# Patient Record
Sex: MALE | Race: BLACK OR AFRICAN AMERICAN | NOT HISPANIC OR LATINO | URBAN - NONMETROPOLITAN AREA
[De-identification: names, ages, dates, MRNs, and addresses within clinical notes are randomized per-mention and may not be internally consistent; named-entity substitution may affect disease eponyms.]

---

## 2020-07-08 ENCOUNTER — OFFICE VISIT (OUTPATIENT)
Dept: URBAN - NONMETROPOLITAN AREA CLINIC 2 | Facility: CLINIC | Age: 55
End: 2020-07-08
Payer: MEDICARE

## 2020-07-08 DIAGNOSIS — D89.9 IMMUNOSUPPRESSED STATUS: ICD-10-CM

## 2020-07-08 DIAGNOSIS — K50.00 CROHN'S DISEASE OF SMALL INTESTINE WITHOUT COMPLICATION: ICD-10-CM

## 2020-07-08 PROCEDURE — G8428 CUR MEDS NOT DOCUMENT: HCPCS | Performed by: INTERNAL MEDICINE

## 2020-07-08 PROCEDURE — 80299 QUANTITATIVE ASSAY DRUG: CPT | Performed by: INTERNAL MEDICINE

## 2020-07-08 PROCEDURE — 99213 OFFICE O/P EST LOW 20 MIN: CPT | Performed by: INTERNAL MEDICINE

## 2020-07-08 PROCEDURE — G8420 CALC BMI NORM PARAMETERS: HCPCS | Performed by: INTERNAL MEDICINE

## 2020-07-08 RX ORDER — AMLODIPINE BESYLATE 2.5 MG/1
TAKE 1 TABLET (2.5 MG) BY ORAL ROUTE ONCE DAILY TABLET ORAL 1
Qty: 0 | Refills: 0 | Status: DISCONTINUED | COMMUNITY
Start: 1900-01-01

## 2020-07-08 RX ORDER — SODIUM BICARBONATE 650 MG/1
TAKE 2 TABLETS BY ORAL ROUTE DAILY TABLET ORAL 1
Qty: 0 | Refills: 0 | Status: ACTIVE | COMMUNITY
Start: 1900-01-01

## 2020-07-08 RX ORDER — CARVEDILOL 6.25 MG/1
1 TABLET WITH FOOD TABLET, FILM COATED ORAL TWICE A DAY
Status: ACTIVE | COMMUNITY

## 2020-07-08 NOTE — HPI-TODAY'S VISIT:
Mr. Cordero returns for follow-up of ileal Crohn's disease.  He underwent ileostomy reversal with Dr. Gillette.  He is doing well so far.  He is moving his bowels 5x daily with occasional nocturnal awakenings.  Stools are mostly formed and he does not consider them to be diarrhea.  He denies abdominal pain.  His colonoscopy was unable to be performed due to rectal stenosis.  He has not trouble affording Humira and otherwise is doing well on this drug.

## 2020-07-27 ENCOUNTER — TELEPHONE ENCOUNTER (OUTPATIENT)
Dept: URBAN - METROPOLITAN AREA CLINIC 92 | Facility: CLINIC | Age: 55
End: 2020-07-27

## 2020-07-27 RX ORDER — SODIUM BICARBONATE 650 MG/1
TAKE 2 TABLETS BY ORAL ROUTE DAILY TABLET ORAL 1
Qty: 0 | Refills: 0 | Status: ACTIVE | COMMUNITY
Start: 1900-01-01

## 2020-07-27 RX ORDER — ADALIMUMAB 40MG/0.4ML
AS DIRECTED KIT SUBCUTANEOUS
Qty: 4 PRE-FILLED PEN SYRINGE | Refills: 6 | OUTPATIENT
Start: 2020-07-27 | End: 2021-02-07

## 2020-07-27 RX ORDER — CARVEDILOL 6.25 MG/1
1 TABLET WITH FOOD TABLET, FILM COATED ORAL TWICE A DAY
Status: ACTIVE | COMMUNITY

## 2020-11-16 ENCOUNTER — OFFICE VISIT (OUTPATIENT)
Dept: URBAN - NONMETROPOLITAN AREA CLINIC 13 | Facility: CLINIC | Age: 55
End: 2020-11-16

## 2020-11-19 ENCOUNTER — OFFICE VISIT (OUTPATIENT)
Dept: URBAN - NONMETROPOLITAN AREA CLINIC 2 | Facility: CLINIC | Age: 55
End: 2020-11-19
Payer: MEDICARE

## 2020-11-19 DIAGNOSIS — K50.018 CROHN'S DISEASE OF SMALL INTESTINE WITH OTHER COMPLICATION: ICD-10-CM

## 2020-11-19 DIAGNOSIS — K62.4 ANAL STENOSIS: ICD-10-CM

## 2020-11-19 PROCEDURE — 99213 OFFICE O/P EST LOW 20 MIN: CPT | Performed by: INTERNAL MEDICINE

## 2020-11-19 PROCEDURE — 3017F COLORECTAL CA SCREEN DOC REV: CPT | Performed by: INTERNAL MEDICINE

## 2020-11-19 PROCEDURE — G8482 FLU IMMUNIZE ORDER/ADMIN: HCPCS | Performed by: INTERNAL MEDICINE

## 2020-11-19 PROCEDURE — G8427 DOCREV CUR MEDS BY ELIG CLIN: HCPCS | Performed by: INTERNAL MEDICINE

## 2020-11-19 RX ORDER — SODIUM BICARBONATE 650 MG/1
TAKE 2 TABLETS BY ORAL ROUTE DAILY TABLET ORAL 1
Qty: 0 | Refills: 0 | Status: ACTIVE | COMMUNITY
Start: 1900-01-01

## 2020-11-19 RX ORDER — CARVEDILOL 6.25 MG/1
1 TABLET WITH FOOD TABLET, FILM COATED ORAL TWICE A DAY
Status: ACTIVE | COMMUNITY

## 2020-11-19 RX ORDER — ADALIMUMAB 40MG/0.4ML
AS DIRECTED KIT SUBCUTANEOUS
Qty: 4 PRE-FILLED PEN SYRINGE | Refills: 6 | Status: ACTIVE | COMMUNITY
Start: 2020-07-27 | End: 2021-02-07

## 2020-11-19 NOTE — HPI-TODAY'S VISIT:
Mr. Cordero returns for follow-up of ileal Crohn's disease.  He underwent ileostomy reversal with Dr. Gillette.  He is doing well so far.  He is moving his bowels 5x daily with occasional nocturnal awakenings.  Stools are mostly formed and he does not consider them to be diarrhea.  He denies abdominal pain.  His colonoscopy was unable to be performed due to rectal stenosis.  He has not trouble affording Humira and otherwise is doing well on this drug.  He is having trouble moving his bowels due to his anal stenosis.  He is able to move them but they are quite small and narrowed.  He remains on Humira and is doing well.

## 2020-11-19 NOTE — PHYSICAL EXAM GASTROINTESTINAL
Abdomen , soft, nontender, nondistended , no guarding or rigidity , no masses palpable , normal bowel sounds , Liver and Spleen , no hepatomegaly present , no hepatosplenomegaly , liver nontender , spleen not palpable , Rectal , Extremely narrow anorectal area.  ? hemorrhoids with some soft hemorrhoid type tissue present.  Unable to advance finger past mid-anal canal.

## 2021-01-06 ENCOUNTER — OFFICE VISIT (OUTPATIENT)
Dept: URBAN - NONMETROPOLITAN AREA CLINIC 2 | Facility: CLINIC | Age: 56
End: 2021-01-06

## 2021-01-28 ENCOUNTER — OFFICE VISIT (OUTPATIENT)
Dept: URBAN - NONMETROPOLITAN AREA CLINIC 2 | Facility: CLINIC | Age: 56
End: 2021-01-28

## 2021-06-23 ENCOUNTER — TELEPHONE ENCOUNTER (OUTPATIENT)
Dept: URBAN - NONMETROPOLITAN AREA CLINIC 2 | Facility: CLINIC | Age: 56
End: 2021-06-23

## 2021-06-23 RX ORDER — ADALIMUMAB 40MG/0.4ML
AS DIRECTED START DAY 28 KIT SUBCUTANEOUS EVERY OTHER WEEK
Qty: 2 PRE-FILLED PEN SYRINGE | Refills: 6 | OUTPATIENT
Start: 2021-06-23 | End: 2022-01-04

## 2021-11-03 ENCOUNTER — TELEPHONE ENCOUNTER (OUTPATIENT)
Dept: URBAN - NONMETROPOLITAN AREA CLINIC 2 | Facility: CLINIC | Age: 56
End: 2021-11-03

## 2021-11-03 RX ORDER — ADALIMUMAB 40MG/0.4ML
AS DIRECTED START DAY 28 KIT SUBCUTANEOUS EVERY OTHER WEEK
Qty: 2 PRE-FILLED PEN SYRINGE | Refills: 6
Start: 2021-06-23 | End: 2022-05-18

## 2021-11-10 ENCOUNTER — ERX REFILL RESPONSE (OUTPATIENT)
Dept: URBAN - NONMETROPOLITAN AREA CLINIC 2 | Facility: CLINIC | Age: 56
End: 2021-11-10

## 2021-11-10 RX ORDER — ADALIMUMAB 40MG/0.4ML
INJECT ONE (1) PEN (40MG) SUBCUTANEOUSLY EVERY OTHER WEEK IN ABDOMEN OR THIGH (ROTATE SITES) KIT SUBCUTANEOUS
Qty: 2 PEN NEEDLE | Refills: 7 | OUTPATIENT

## 2021-11-30 ENCOUNTER — ERX REFILL RESPONSE (OUTPATIENT)
Dept: URBAN - NONMETROPOLITAN AREA CLINIC 2 | Facility: CLINIC | Age: 56
End: 2021-11-30

## 2021-11-30 ENCOUNTER — TELEPHONE ENCOUNTER (OUTPATIENT)
Dept: URBAN - METROPOLITAN AREA CLINIC 92 | Facility: CLINIC | Age: 56
End: 2021-11-30

## 2021-11-30 RX ORDER — ADALIMUMAB 40MG/0.4ML
INJECT ONE (1) PEN (40MG) SUBCUTANEOUSLY EVERY OTHER WEEK IN ABDOMEN OR THIGH (ROTATE SITES) KIT SUBCUTANEOUS
Qty: 2 PEN NEEDLE | Refills: 7 | OUTPATIENT

## 2022-01-13 ENCOUNTER — WEB ENCOUNTER (OUTPATIENT)
Dept: URBAN - NONMETROPOLITAN AREA CLINIC 2 | Facility: CLINIC | Age: 57
End: 2022-01-13

## 2022-01-13 ENCOUNTER — OFFICE VISIT (OUTPATIENT)
Dept: URBAN - NONMETROPOLITAN AREA CLINIC 2 | Facility: CLINIC | Age: 57
End: 2022-01-13
Payer: MEDICARE

## 2022-01-13 VITALS
SYSTOLIC BLOOD PRESSURE: 117 MMHG | HEART RATE: 73 BPM | BODY MASS INDEX: 19.49 KG/M2 | DIASTOLIC BLOOD PRESSURE: 72 MMHG | WEIGHT: 117 LBS | TEMPERATURE: 97.9 F | HEIGHT: 65 IN

## 2022-01-13 DIAGNOSIS — K50.018 CROHN'S DISEASE OF SMALL INTESTINE WITH OTHER COMPLICATION: ICD-10-CM

## 2022-01-13 DIAGNOSIS — K62.4 ANAL STENOSIS: ICD-10-CM

## 2022-01-13 PROCEDURE — 99214 OFFICE O/P EST MOD 30 MIN: CPT | Performed by: NURSE PRACTITIONER

## 2022-01-13 RX ORDER — ADALIMUMAB 40MG/0.4ML
AS DIRECTED START DAY 28 KIT SUBCUTANEOUS EVERY OTHER WEEK
Qty: 2 PRE-FILLED PEN SYRINGE | Refills: 6 | Status: ACTIVE | COMMUNITY
Start: 2021-06-23 | End: 2022-05-18

## 2022-01-13 RX ORDER — COLESTIPOL HYDROCHLORIDE 1 G/1
2 TABLETS TABLET ORAL ONCE A DAY
Qty: 60 | OUTPATIENT
Start: 2022-01-13

## 2022-01-13 RX ORDER — ADALIMUMAB 40MG/0.4ML
INJECT ONE (1) PEN (40MG) SUBCUTANEOUSLY EVERY OTHER WEEK IN ABDOMEN OR THIGH (ROTATE SITES) KIT SUBCUTANEOUS
Qty: 2 PEN NEEDLE | Refills: 7 | Status: ACTIVE | COMMUNITY

## 2022-01-13 RX ORDER — CARVEDILOL 6.25 MG/1
1 TABLET WITH FOOD TABLET, FILM COATED ORAL TWICE A DAY
Status: ACTIVE | COMMUNITY

## 2022-01-13 RX ORDER — SODIUM BICARBONATE 650 MG/1
TAKE 2 TABLETS BY ORAL ROUTE DAILY TABLET ORAL 1
Qty: 0 | Refills: 0 | Status: ACTIVE | COMMUNITY
Start: 1900-01-01

## 2022-01-13 NOTE — HPI-TODAY'S VISIT:
Mr. Cordero returns for follow-up of ileal Crohn's disease.  He underwent ileostomy reversal with Dr. Gillette.  He is doing well so far.  He is moving his bowels 3-4 times daily. loose, but no blood. denies pain.  His colonoscopy was unable to be performed due to rectal stenosis.  He has not trouble affording Humira and otherwise is doing well on this drug.  He is having trouble moving his bowels due to his anal stenosis.   He remains on Humira and is doing well.  sb

## 2022-01-20 ENCOUNTER — TELEPHONE ENCOUNTER (OUTPATIENT)
Dept: URBAN - METROPOLITAN AREA CLINIC 92 | Facility: CLINIC | Age: 57
End: 2022-01-20

## 2022-03-07 ENCOUNTER — TELEPHONE ENCOUNTER (OUTPATIENT)
Dept: URBAN - NONMETROPOLITAN AREA CLINIC 2 | Facility: CLINIC | Age: 57
End: 2022-03-07

## 2022-03-07 RX ORDER — CHOLESTYRAMINE 4 G/9G
1 SCOOP POWDER, FOR SUSPENSION ORAL ONCE A DAY
Qty: 30 | OUTPATIENT
Start: 2022-03-08

## 2022-03-07 RX ORDER — ADALIMUMAB 40MG/0.4ML
AS DIRECTED START DAY 28 KIT SUBCUTANEOUS EVERY OTHER WEEK
Qty: 2 PRE-FILLED PEN SYRINGE | Refills: 6 | OUTPATIENT
Start: 2022-03-08 | End: 2022-09-20

## 2022-03-08 ENCOUNTER — ERX REFILL RESPONSE (OUTPATIENT)
Dept: URBAN - NONMETROPOLITAN AREA CLINIC 2 | Facility: CLINIC | Age: 57
End: 2022-03-08

## 2022-03-08 RX ORDER — CHOLESTYRAMINE 4 G/9G
1 SCOOP POWDER, FOR SUSPENSION ORAL ONCE A DAY
Qty: 30 | OUTPATIENT

## 2022-03-08 RX ORDER — CHOLESTYRAMINE 4 G/9G
MIX 1 SCOOP AS DIRECTED ONCE DAILY POWDER, FOR SUSPENSION ORAL
Qty: 1134 GRAM | Refills: 12 | OUTPATIENT

## 2022-07-13 ENCOUNTER — OFFICE VISIT (OUTPATIENT)
Dept: URBAN - NONMETROPOLITAN AREA CLINIC 2 | Facility: CLINIC | Age: 57
End: 2022-07-13
Payer: MEDICARE

## 2022-07-13 VITALS
WEIGHT: 112 LBS | DIASTOLIC BLOOD PRESSURE: 73 MMHG | BODY MASS INDEX: 18.66 KG/M2 | HEART RATE: 81 BPM | SYSTOLIC BLOOD PRESSURE: 124 MMHG | HEIGHT: 65 IN

## 2022-07-13 DIAGNOSIS — K62.4 ANAL STENOSIS: ICD-10-CM

## 2022-07-13 DIAGNOSIS — K50.018 CROHN'S DISEASE OF SMALL INTESTINE WITH OTHER COMPLICATION: ICD-10-CM

## 2022-07-13 PROCEDURE — 99214 OFFICE O/P EST MOD 30 MIN: CPT | Performed by: NURSE PRACTITIONER

## 2022-07-13 RX ORDER — CARVEDILOL 6.25 MG/1
1 TABLET WITH FOOD TABLET, FILM COATED ORAL TWICE A DAY
Status: ACTIVE | COMMUNITY

## 2022-07-13 RX ORDER — COLESTIPOL HYDROCHLORIDE 1 G/1
2 TABLETS TABLET ORAL ONCE A DAY
Qty: 60 | OUTPATIENT

## 2022-07-13 RX ORDER — CHOLESTYRAMINE 4 G/9G
MIX 1 SCOOP AS DIRECTED ONCE DAILY POWDER, FOR SUSPENSION ORAL
Qty: 1134 GRAM | Refills: 12 | Status: ACTIVE | COMMUNITY

## 2022-07-13 RX ORDER — ADALIMUMAB 40MG/0.4ML
INJECT ONE (1) PEN (40MG) SUBCUTANEOUSLY EVERY OTHER WEEK IN ABDOMEN OR THIGH (ROTATE SITES) KIT SUBCUTANEOUS
Qty: 2 PEN NEEDLE | Refills: 7 | Status: ACTIVE | COMMUNITY

## 2022-07-13 RX ORDER — ADALIMUMAB 40MG/0.4ML
AS DIRECTED START DAY 28 KIT SUBCUTANEOUS EVERY OTHER WEEK
Qty: 2 PRE-FILLED PEN SYRINGE | Refills: 6 | Status: ACTIVE | COMMUNITY
Start: 2022-03-08 | End: 2022-09-20

## 2022-07-13 RX ORDER — SODIUM BICARBONATE 650 MG/1
TAKE 2 TABLETS BY ORAL ROUTE DAILY TABLET ORAL 1
Qty: 0 | Refills: 0 | Status: ACTIVE | COMMUNITY
Start: 1900-01-01

## 2022-07-13 RX ORDER — COLESTIPOL HYDROCHLORIDE 1 G/1
2 TABLETS TABLET ORAL ONCE A DAY
Qty: 60 | Status: ACTIVE | COMMUNITY
Start: 2022-01-13

## 2022-07-13 NOTE — HPI-TODAY'S VISIT:
Mr. Cordero returns for follow-up of ileal Crohn's disease.  He underwent ileostomy reversal with Dr. Gillette.  He is doing well so far.  He is moving his bowels 4-5 times daily. loose, but no blood. denies pain.  His colonoscopy was unable to be performed due to rectal stenosis.  He has not trouble affording Humira and otherwise is doing well on this drug.  He reports that the colestid was out of stock and was given questran. He doesn't care for this medication.   He remains on Humira and is doing well.  sb

## 2022-07-18 ENCOUNTER — TELEPHONE ENCOUNTER (OUTPATIENT)
Dept: URBAN - METROPOLITAN AREA CLINIC 92 | Facility: CLINIC | Age: 57
End: 2022-07-18

## 2022-08-22 ENCOUNTER — ERX REFILL RESPONSE (OUTPATIENT)
Dept: URBAN - NONMETROPOLITAN AREA CLINIC 2 | Facility: CLINIC | Age: 57
End: 2022-08-22

## 2022-08-22 RX ORDER — COLESTIPOL HYDROCHLORIDE 1 G/1
TAKE 2 TABLETS BY MOUTH EVERY DAY TABLET ORAL
Qty: 60 TABLET | Refills: 3 | OUTPATIENT

## 2022-08-22 RX ORDER — COLESTIPOL HYDROCHLORIDE 1 G/1
2 TABLETS TABLET ORAL ONCE A DAY
Qty: 60 | OUTPATIENT

## 2022-12-14 ENCOUNTER — OFFICE VISIT (OUTPATIENT)
Dept: URBAN - NONMETROPOLITAN AREA CLINIC 2 | Facility: CLINIC | Age: 57
End: 2022-12-14

## 2022-12-14 RX ORDER — CHOLESTYRAMINE 4 G/9G
MIX 1 SCOOP AS DIRECTED ONCE DAILY POWDER, FOR SUSPENSION ORAL
Qty: 1134 GRAM | Refills: 12 | Status: ACTIVE | COMMUNITY

## 2022-12-14 RX ORDER — SODIUM BICARBONATE 650 MG/1
TAKE 2 TABLETS BY ORAL ROUTE DAILY TABLET ORAL 1
Qty: 0 | Refills: 0 | Status: ACTIVE | COMMUNITY
Start: 1900-01-01

## 2022-12-14 RX ORDER — COLESTIPOL HYDROCHLORIDE 1 G/1
2 TABLETS TABLET ORAL ONCE A DAY
Qty: 60 | OUTPATIENT

## 2022-12-14 RX ORDER — CARVEDILOL 6.25 MG/1
1 TABLET WITH FOOD TABLET, FILM COATED ORAL TWICE A DAY
Status: ACTIVE | COMMUNITY

## 2022-12-14 RX ORDER — COLESTIPOL HYDROCHLORIDE 1 G/1
TAKE 2 TABLETS BY MOUTH EVERY DAY TABLET ORAL
Qty: 60 TABLET | Refills: 3 | Status: ACTIVE | COMMUNITY

## 2022-12-14 RX ORDER — ADALIMUMAB 40MG/0.4ML
INJECT ONE (1) PEN (40MG) SUBCUTANEOUSLY EVERY OTHER WEEK IN ABDOMEN OR THIGH (ROTATE SITES) KIT SUBCUTANEOUS
Qty: 2 PEN NEEDLE | Refills: 7 | Status: ACTIVE | COMMUNITY

## 2022-12-14 NOTE — HPI-TODAY'S VISIT:
12/14/22: Mr. Cordero returns for follow-up of ileal Crohn's disease.  7/13/22: Mr. Cordero returns for follow-up of ileal Crohn's disease.  He underwent ileostomy reversal with Dr. Gillette.  He is doing well so far.  He is moving his bowels 4-5 times daily. loose, but no blood. denies pain.  His colonoscopy was unable to be performed due to rectal stenosis.  He has not trouble affording Humira and otherwise is doing well on this drug.  He reports that the colestid was out of stock and was given questran. He doesn't care for this medication.   He remains on Humira and is doing well.  sb

## 2023-01-24 ENCOUNTER — OUT OF OFFICE VISIT (OUTPATIENT)
Dept: URBAN - METROPOLITAN AREA MEDICAL CENTER 1 | Facility: MEDICAL CENTER | Age: 58
End: 2023-01-24
Payer: MEDICARE

## 2023-01-24 DIAGNOSIS — K50.813 CROHN'S DISEASE OF BOTH SMALL AND LARGE INTESTINE W FISTULA: ICD-10-CM

## 2023-01-24 DIAGNOSIS — K62.89 ACUTE PROCTITIS: ICD-10-CM

## 2023-01-24 DIAGNOSIS — A41.89 OTHER SPECIFIED SEPSIS: ICD-10-CM

## 2023-01-24 DIAGNOSIS — D64.89 ANEMIA DUE TO OTHER CAUSE: ICD-10-CM

## 2023-01-24 PROCEDURE — G8427 DOCREV CUR MEDS BY ELIG CLIN: HCPCS

## 2023-01-24 PROCEDURE — 99232 SBSQ HOSP IP/OBS MODERATE 35: CPT

## 2023-01-24 PROCEDURE — 99222 1ST HOSP IP/OBS MODERATE 55: CPT

## 2023-01-27 ENCOUNTER — TELEPHONE ENCOUNTER (OUTPATIENT)
Dept: URBAN - METROPOLITAN AREA CLINIC 92 | Facility: CLINIC | Age: 58
End: 2023-01-27

## 2023-02-02 ENCOUNTER — OFFICE VISIT (OUTPATIENT)
Dept: URBAN - NONMETROPOLITAN AREA CLINIC 2 | Facility: CLINIC | Age: 58
End: 2023-02-02
Payer: MEDICARE

## 2023-02-02 VITALS
HEIGHT: 65 IN | BODY MASS INDEX: 18.99 KG/M2 | HEART RATE: 84 BPM | TEMPERATURE: 97.4 F | WEIGHT: 114 LBS | DIASTOLIC BLOOD PRESSURE: 76 MMHG | SYSTOLIC BLOOD PRESSURE: 146 MMHG

## 2023-02-02 DIAGNOSIS — K62.4 ANAL STENOSIS: ICD-10-CM

## 2023-02-02 DIAGNOSIS — K50.018 CROHN'S DISEASE OF SMALL INTESTINE WITH OTHER COMPLICATION: ICD-10-CM

## 2023-02-02 PROCEDURE — 99214 OFFICE O/P EST MOD 30 MIN: CPT | Performed by: NURSE PRACTITIONER

## 2023-02-02 RX ORDER — SODIUM BICARBONATE 650 MG/1
TAKE 2 TABLETS BY ORAL ROUTE DAILY TABLET ORAL 1
Qty: 0 | Refills: 0 | Status: ACTIVE | COMMUNITY
Start: 1900-01-01

## 2023-02-02 RX ORDER — COLESTIPOL HYDROCHLORIDE 1 G/1
2 TABLETS TABLET ORAL ONCE A DAY
Qty: 60 | Status: ACTIVE | COMMUNITY

## 2023-02-02 RX ORDER — ADALIMUMAB 40MG/0.4ML
INJECT ONE (1) PEN (40MG) SUBCUTANEOUSLY EVERY OTHER WEEK IN ABDOMEN OR THIGH (ROTATE SITES) KIT SUBCUTANEOUS
Qty: 2 PEN NEEDLE | Refills: 7 | Status: ACTIVE | COMMUNITY

## 2023-02-02 RX ORDER — CARVEDILOL 6.25 MG/1
1 TABLET WITH FOOD TABLET, FILM COATED ORAL TWICE A DAY
Status: ACTIVE | COMMUNITY

## 2023-02-02 RX ORDER — COLESTIPOL HYDROCHLORIDE 1 G/1
TAKE 2 TABLETS BY MOUTH EVERY DAY TABLET ORAL
Qty: 60 TABLET | Refills: 3 | Status: ACTIVE | COMMUNITY

## 2023-02-02 RX ORDER — CHOLESTYRAMINE 4 G/9G
MIX 1 SCOOP AS DIRECTED ONCE DAILY POWDER, FOR SUSPENSION ORAL
Qty: 1134 GRAM | Refills: 12 | Status: ACTIVE | COMMUNITY

## 2023-02-02 NOTE — HPI-TODAY'S VISIT:
Mr. Cordero returns for follow-up of ileal Crohn's disease.  He underwent ileostomy reversal with Dr. Johnson.  He is doing well so far.  He is moving his bowels 4-5 times daily. loose, but no blood. denies pain.  His colonoscopy was unable to be performed due to rectal stenosis.  Unfortunately, he was recently admitted to [facility] with procitis and required an anal dil by Dr johnson. He is currently doing well. He is here today to discuss transitioning to remicade from humira.   sb

## 2023-02-07 LAB
A/G RATIO: 0.6
ABSOLUTE BASOPHILS: 67
ABSOLUTE EOSINOPHILS: 121
ABSOLUTE LYMPHOCYTES: 1313
ABSOLUTE MONOCYTES: 415
ABSOLUTE NEUTROPHILS: 4784
ALBUMIN: 2.7
ALKALINE PHOSPHATASE: 79
ALT (SGPT): 10
AST (SGOT): 14
BASOPHILS: 1
BILIRUBIN, TOTAL: 0.2
BUN/CREATININE RATIO: 5
BUN: 16
C-REACTIVE PROTEIN, QUANT: 28.5
CALCIUM: 8.8
CARBON DIOXIDE, TOTAL: 29
CHLORIDE: 105
CREATININE: 2.98
EGFR: 24
EOSINOPHILS: 1.8
GLOBULIN, TOTAL: 4.5
GLUCOSE: 97
HEMATOCRIT: 26.4
HEMOGLOBIN: 8
LYMPHOCYTES: 19.6
MCH: 25.2
MCHC: 30.3
MCV: 83.3
MITOGEN-NIL: >10
MONOCYTES: 6.2
MPV: 8.7
NEUTROPHILS: 71.4
PLATELET COUNT: 631
POTASSIUM: 4.3
PROTEIN, TOTAL: 7.2
QUANTIFERON NIL VALUE: 0.07
QUANTIFERON TB1 AG VALUE: 0
QUANTIFERON TB2 AG VALUE: <0
QUANTIFERON-TB GOLD PLUS: NEGATIVE
RDW: 15.8
RED BLOOD CELL COUNT: 3.17
SED RATE BY MODIFIED: 127
SODIUM: 142
WHITE BLOOD CELL COUNT: 6.7

## 2023-02-08 ENCOUNTER — TELEPHONE ENCOUNTER (OUTPATIENT)
Dept: URBAN - METROPOLITAN AREA CLINIC 92 | Facility: CLINIC | Age: 58
End: 2023-02-08

## 2023-02-08 PROBLEM — 56689002: Status: ACTIVE | Noted: 2020-11-19

## 2023-02-08 RX ORDER — CHOLESTYRAMINE 4 G/9G
MIX 1 SCOOP AS DIRECTED ONCE DAILY POWDER, FOR SUSPENSION ORAL
Qty: 1134 GRAM | Refills: 12 | Status: ACTIVE | COMMUNITY

## 2023-02-08 RX ORDER — ADALIMUMAB 40MG/0.4ML
INJECT ONE (1) PEN (40MG) SUBCUTANEOUSLY EVERY OTHER WEEK IN ABDOMEN OR THIGH (ROTATE SITES) KIT SUBCUTANEOUS
Qty: 2 PEN NEEDLE | Refills: 7 | Status: ACTIVE | COMMUNITY

## 2023-02-08 RX ORDER — COLESTIPOL HYDROCHLORIDE 1 G/1
2 TABLETS TABLET ORAL ONCE A DAY
Qty: 60 | Status: ACTIVE | COMMUNITY

## 2023-02-08 RX ORDER — SODIUM BICARBONATE 650 MG/1
TAKE 2 TABLETS BY ORAL ROUTE DAILY TABLET ORAL 1
Qty: 0 | Refills: 0 | Status: ACTIVE | COMMUNITY
Start: 1900-01-01

## 2023-02-08 RX ORDER — COLESTIPOL HYDROCHLORIDE 1 G/1
TAKE 2 TABLETS BY MOUTH EVERY DAY TABLET ORAL
Qty: 60 TABLET | Refills: 3 | Status: ACTIVE | COMMUNITY

## 2023-02-08 RX ORDER — CARVEDILOL 6.25 MG/1
1 TABLET WITH FOOD TABLET, FILM COATED ORAL TWICE A DAY
Status: ACTIVE | COMMUNITY

## 2023-02-08 RX ORDER — INFLIXIMAB 100 MG/1
5MG/KG  0, 2, 6, AND THEN EVERY 8 WEEKS IV INJECTION, POWDER, LYOPHILIZED, FOR SOLUTION INTRAVENOUS
OUTPATIENT
Start: 2023-02-08

## 2023-03-27 ENCOUNTER — TELEPHONE ENCOUNTER (OUTPATIENT)
Dept: URBAN - METROPOLITAN AREA CLINIC 35 | Facility: CLINIC | Age: 58
End: 2023-03-27

## 2023-03-27 ENCOUNTER — LAB OUTSIDE AN ENCOUNTER (OUTPATIENT)
Dept: URBAN - NONMETROPOLITAN AREA CLINIC 2 | Facility: CLINIC | Age: 58
End: 2023-03-27

## 2023-03-27 LAB
A/G RATIO: 0.7
ALBUMIN: 3.4
ALKALINE PHOSPHATASE: 105
ALT (SGPT): 8
ANION GAP: 13
AST (SGOT): 12
BANDS - MAN (DIFF): 1
BILIRUBIN TOTAL: 0.4
BLOOD UREA NITROGEN: 20
BUN / CREAT RATIO: 7
CALCIUM: 9.1
CHLORIDE: 104
CO2: 26
CREATININE, SERUM: 2.81
EGFR (CKD-EPI): 25
GLUCOSE: 93
HEMATOCRIT: 33.4
HEMOGLOBIN: 10.7
LYMPHOCYTES - MAN (DIFF): 9
LYMPHS MANUAL ABSOLUTE PAR: 0.75
MEAN CORPUSCULAR HEMOGLOBIN CONC: 32
MEAN CORPUSCULAR HEMOGLOBIN: 27.1
MEAN CORPUSCULAR VOLUME: 84.7
MEAN PLATELET VOLUME: 7.5
METAMYELOCTYES - MAN (DIFF): 2
MONOCYTES - MAN (DIFF): 1
MONOCYTES MANUAL ABSOLUTE COUNT PAR: 0.08
NEUTROPHILS - MAN (DIFF): 87
NEUTROPHILS MANUAL ABSOLUTE COUNT PAR: 7.3
PAR PLATELET MORPHOLOGY: (no result)
PAR RBC MORPHOLOGY: (no result)
PLATELET COUNT: 294
POTASSIUM: 3.7
PROTEIN TOTAL: 8.6
RED BLOOD CELL COUNT: 3.95
RED CELL DISTRIBUTION WIDTH: 17.2
SODIUM: 139
WHITE BLOOD CELL COUNT: 8.3

## 2023-03-28 ENCOUNTER — LAB OUTSIDE AN ENCOUNTER (OUTPATIENT)
Dept: URBAN - METROPOLITAN AREA CLINIC 35 | Facility: CLINIC | Age: 58
End: 2023-03-28

## 2023-04-12 ENCOUNTER — TELEPHONE ENCOUNTER (OUTPATIENT)
Dept: URBAN - NONMETROPOLITAN AREA CLINIC 2 | Facility: CLINIC | Age: 58
End: 2023-04-12

## 2023-04-27 ENCOUNTER — TELEPHONE ENCOUNTER (OUTPATIENT)
Dept: URBAN - NONMETROPOLITAN AREA CLINIC 2 | Facility: CLINIC | Age: 58
End: 2023-04-27

## 2023-04-27 ENCOUNTER — LAB OUTSIDE AN ENCOUNTER (OUTPATIENT)
Dept: URBAN - NONMETROPOLITAN AREA CLINIC 2 | Facility: CLINIC | Age: 58
End: 2023-04-27

## 2023-04-27 PROBLEM — 197216007: Status: ACTIVE | Noted: 2023-04-27

## 2023-04-27 RX ORDER — CIPROFLOXACIN TABLETS 500 MG/1
1 TABLET TABLET, COATED ORAL
Qty: 28 TABLET | Refills: 0 | OUTPATIENT
Start: 2023-04-27 | End: 2023-05-11

## 2023-04-27 RX ORDER — METRONIDAZOLE 500 MG/1
1 TABLET TABLET ORAL THREE TIMES A DAY
Qty: 42 TABLET | Refills: 0 | OUTPATIENT
Start: 2023-04-27 | End: 2023-05-11

## 2023-04-27 RX ORDER — SODIUM, POTASSIUM,MAG SULFATES 17.5-3.13G
177ML SOLUTION, RECONSTITUTED, ORAL ORAL
Qty: 1 | OUTPATIENT
Start: 2023-04-27 | End: 2023-04-29

## 2023-05-02 ENCOUNTER — TELEPHONE ENCOUNTER (OUTPATIENT)
Dept: URBAN - NONMETROPOLITAN AREA CLINIC 2 | Facility: CLINIC | Age: 58
End: 2023-05-02

## 2023-05-15 ENCOUNTER — OFFICE VISIT (OUTPATIENT)
Dept: URBAN - METROPOLITAN AREA MEDICAL CENTER 1 | Facility: MEDICAL CENTER | Age: 58
End: 2023-05-15
Payer: MEDICARE

## 2023-05-15 ENCOUNTER — LAB OUTSIDE AN ENCOUNTER (OUTPATIENT)
Dept: URBAN - NONMETROPOLITAN AREA CLINIC 2 | Facility: CLINIC | Age: 58
End: 2023-05-15

## 2023-05-15 DIAGNOSIS — K50.111 CROHN'S COLITIS, WITH RECTAL BLEEDING: ICD-10-CM

## 2023-05-15 PROCEDURE — 45380 COLONOSCOPY AND BIOPSY: CPT | Performed by: INTERNAL MEDICINE

## 2023-05-16 LAB
AP CASE REPORT: (no result)
AP FINAL DIAGNOSIS: (no result)
AP GROSS DESCRIPTION: (no result)
AP MICROSCOPIC DESCRIPTION: (no result)

## 2023-05-26 ENCOUNTER — TELEPHONE ENCOUNTER (OUTPATIENT)
Dept: URBAN - NONMETROPOLITAN AREA CLINIC 2 | Facility: CLINIC | Age: 58
End: 2023-05-26

## 2023-06-19 ENCOUNTER — TELEPHONE ENCOUNTER (OUTPATIENT)
Dept: URBAN - NONMETROPOLITAN AREA CLINIC 2 | Facility: CLINIC | Age: 58
End: 2023-06-19

## 2023-06-29 ENCOUNTER — TELEPHONE ENCOUNTER (OUTPATIENT)
Dept: URBAN - NONMETROPOLITAN AREA CLINIC 2 | Facility: CLINIC | Age: 58
End: 2023-06-29

## 2023-07-11 ENCOUNTER — TELEPHONE ENCOUNTER (OUTPATIENT)
Dept: URBAN - NONMETROPOLITAN AREA CLINIC 2 | Facility: CLINIC | Age: 58
End: 2023-07-11

## 2023-08-02 ENCOUNTER — OFFICE VISIT (OUTPATIENT)
Dept: URBAN - NONMETROPOLITAN AREA CLINIC 2 | Facility: CLINIC | Age: 58
End: 2023-08-02
Payer: MEDICARE

## 2023-08-02 VITALS
HEIGHT: 65 IN | BODY MASS INDEX: 17.83 KG/M2 | DIASTOLIC BLOOD PRESSURE: 84 MMHG | SYSTOLIC BLOOD PRESSURE: 146 MMHG | HEART RATE: 79 BPM | WEIGHT: 107 LBS

## 2023-08-02 DIAGNOSIS — K50.119 CROHN'S DISEASE OF PERIANAL REGION WITH COMPLICATION: ICD-10-CM

## 2023-08-02 DIAGNOSIS — K62.4 ANAL STENOSIS: ICD-10-CM

## 2023-08-02 DIAGNOSIS — K50.00 CROHN'S DISEASE OF SMALL INTESTINE WITHOUT COMPLICATION: ICD-10-CM

## 2023-08-02 PROBLEM — 235796008: Status: ACTIVE | Noted: 2023-08-02

## 2023-08-02 PROBLEM — 56689002: Status: ACTIVE | Noted: 2023-08-02

## 2023-08-02 PROCEDURE — 99213 OFFICE O/P EST LOW 20 MIN: CPT | Performed by: INTERNAL MEDICINE

## 2023-08-02 RX ORDER — CHOLESTYRAMINE 4 G/9G
MIX 1 SCOOP AS DIRECTED ONCE DAILY POWDER, FOR SUSPENSION ORAL
Qty: 1134 GRAM | Refills: 12 | Status: ACTIVE | COMMUNITY

## 2023-08-02 RX ORDER — ADALIMUMAB 40MG/0.4ML
INJECT ONE (1) PEN (40MG) SUBCUTANEOUSLY EVERY OTHER WEEK IN ABDOMEN OR THIGH (ROTATE SITES) KIT SUBCUTANEOUS
Qty: 2 PEN NEEDLE | Refills: 7 | Status: ACTIVE | COMMUNITY

## 2023-08-02 RX ORDER — SODIUM BICARBONATE 650 MG/1
TAKE 2 TABLETS BY ORAL ROUTE DAILY TABLET ORAL 1
Qty: 0 | Refills: 0 | Status: ACTIVE | COMMUNITY
Start: 1900-01-01

## 2023-08-02 RX ORDER — CARVEDILOL 6.25 MG/1
1 TABLET WITH FOOD TABLET, FILM COATED ORAL TWICE A DAY
Status: ACTIVE | COMMUNITY

## 2023-08-02 RX ORDER — COLESTIPOL HYDROCHLORIDE 1 G/1
2 TABLETS TABLET ORAL ONCE A DAY
Qty: 60 | Status: ACTIVE | COMMUNITY

## 2023-08-02 RX ORDER — COLESTIPOL HYDROCHLORIDE 1 G/1
TAKE 2 TABLETS BY MOUTH EVERY DAY TABLET ORAL
Qty: 60 TABLET | Refills: 3 | Status: ACTIVE | COMMUNITY

## 2023-08-02 RX ORDER — INFLIXIMAB 100 MG/1
5MG/KG  0, 2, 6, AND THEN EVERY 8 WEEKS IV INJECTION, POWDER, LYOPHILIZED, FOR SOLUTION INTRAVENOUS
Status: ACTIVE | COMMUNITY
Start: 2023-02-08

## 2023-08-02 NOTE — HPI-TODAY'S VISIT:
8/2/23: Mr. Cordero returns for follow-up of ileal Crohn's disease.  Since his last clinic visit, he underwent a colonoscopy using the XP scope to traverse his anal stenosis.  This showed a normal colon and ileum but a lot of ulceration and inflammation in his anal canal.  He reports that he has been doing a bit better.  His perianal complaints have improved.  He reports a "hemorrhoid" burst and he is feeling better down there of late.  He is still having a lot of diarrhea and moving his bowels up to 6x per day.  He is not on anti-diarrheals.  He does not like colestyramine powder but does not recall if it helped much.  2/2/23: Mr. Cordero returns for follow-up of ileal Crohn's disease.  He underwent ileostomy reversal with Dr. Johnson.  He is doing well so far.  He is moving his bowels 4-5 times daily. loose, but no blood. denies pain.  His colonoscopy was unable to be performed due to rectal stenosis.  Unfortunately, he was recently admitted to [facility] with procitis and required an anal dil by Dr johnson. He is currently doing well. He is here today to discuss transitioning to remicade from humira.   sb

## 2023-08-14 ENCOUNTER — TELEPHONE ENCOUNTER (OUTPATIENT)
Dept: URBAN - NONMETROPOLITAN AREA CLINIC 2 | Facility: CLINIC | Age: 58
End: 2023-08-14

## 2023-08-30 ENCOUNTER — P2P PATIENT RECORD (OUTPATIENT)
Age: 58
End: 2023-08-30

## 2023-09-11 ENCOUNTER — TELEPHONE ENCOUNTER (OUTPATIENT)
Dept: URBAN - NONMETROPOLITAN AREA CLINIC 2 | Facility: CLINIC | Age: 58
End: 2023-09-11

## 2023-09-23 NOTE — PHYSICAL EXAM CHEST:
no lesions,  no deformities,  no traumatic injuries,  no significant scars are present,  chest wall non-tender,  no masses present, breathing is unlabored without accessory muscle use, normal breath sounds Bowel obstruction

## 2023-10-11 ENCOUNTER — TELEPHONE ENCOUNTER (OUTPATIENT)
Dept: URBAN - NONMETROPOLITAN AREA CLINIC 2 | Facility: CLINIC | Age: 58
End: 2023-10-11

## 2023-11-02 ENCOUNTER — OFFICE VISIT (OUTPATIENT)
Dept: URBAN - NONMETROPOLITAN AREA CLINIC 2 | Facility: CLINIC | Age: 58
End: 2023-11-02

## 2023-11-08 ENCOUNTER — CLAIMS CREATED FROM THE CLAIM WINDOW (OUTPATIENT)
Dept: URBAN - NONMETROPOLITAN AREA CLINIC 2 | Facility: CLINIC | Age: 58
End: 2023-11-08
Payer: MEDICARE

## 2023-11-08 ENCOUNTER — LAB OUTSIDE AN ENCOUNTER (OUTPATIENT)
Dept: URBAN - NONMETROPOLITAN AREA CLINIC 2 | Facility: CLINIC | Age: 58
End: 2023-11-08

## 2023-11-08 VITALS
TEMPERATURE: 98.1 F | DIASTOLIC BLOOD PRESSURE: 88 MMHG | SYSTOLIC BLOOD PRESSURE: 145 MMHG | WEIGHT: 108.6 LBS | HEIGHT: 65 IN | HEART RATE: 74 BPM | BODY MASS INDEX: 18.09 KG/M2

## 2023-11-08 DIAGNOSIS — R93.5 ABNORMAL CT OF THE ABDOMEN: ICD-10-CM

## 2023-11-08 DIAGNOSIS — K50.119 CROHN'S DISEASE OF PERIANAL REGION WITH COMPLICATION: ICD-10-CM

## 2023-11-08 DIAGNOSIS — K50.00 CROHN'S DISEASE OF SMALL INTESTINE WITHOUT COMPLICATION: ICD-10-CM

## 2023-11-08 DIAGNOSIS — K62.4 ANAL STENOSIS: ICD-10-CM

## 2023-11-08 DIAGNOSIS — K50.118 CROHN'S DISEASE OF COLON WITH OTHER COMPLICATION: ICD-10-CM

## 2023-11-08 PROCEDURE — 99214 OFFICE O/P EST MOD 30 MIN: CPT | Performed by: NURSE PRACTITIONER

## 2023-11-08 RX ORDER — CARVEDILOL 6.25 MG/1
1 TABLET WITH FOOD TABLET, FILM COATED ORAL TWICE A DAY
Status: ACTIVE | COMMUNITY

## 2023-11-08 RX ORDER — COLESTIPOL HYDROCHLORIDE 1 G/1
2 TABLETS TABLET ORAL ONCE A DAY
Qty: 60 | Status: ACTIVE | COMMUNITY

## 2023-11-08 RX ORDER — SODIUM BICARBONATE 650 MG/1
TAKE 2 TABLETS BY ORAL ROUTE DAILY TABLET ORAL 1
Qty: 0 | Refills: 0 | Status: ACTIVE | COMMUNITY
Start: 1900-01-01

## 2023-11-08 RX ORDER — COLESTIPOL HYDROCHLORIDE 1 G/1
TAKE 2 TABLETS BY MOUTH EVERY DAY TABLET ORAL
Qty: 60 TABLET | Refills: 3 | Status: ACTIVE | COMMUNITY

## 2023-11-08 RX ORDER — CHOLESTYRAMINE 4 G/9G
MIX 1 SCOOP AS DIRECTED ONCE DAILY POWDER, FOR SUSPENSION ORAL
Qty: 1134 GRAM | Refills: 12 | Status: ACTIVE | COMMUNITY

## 2023-11-08 RX ORDER — ADALIMUMAB 40MG/0.4ML
INJECT ONE (1) PEN (40MG) SUBCUTANEOUSLY EVERY OTHER WEEK IN ABDOMEN OR THIGH (ROTATE SITES) KIT SUBCUTANEOUS
Qty: 2 PEN NEEDLE | Refills: 7 | Status: ACTIVE | COMMUNITY

## 2023-11-08 RX ORDER — INFLIXIMAB 100 MG/1
5MG/KG  0, 2, 6, AND THEN EVERY 8 WEEKS IV INJECTION, POWDER, LYOPHILIZED, FOR SOLUTION INTRAVENOUS
Status: ACTIVE | COMMUNITY
Start: 2023-02-08

## 2023-11-08 NOTE — HPI-TODAY'S VISIT:
: Mr. Cordero returns for follow-up of ileal Crohn's disease.  he underwent a colonoscopy using the XP scope to traverse his anal stenosis.  This showed a normal colon and ileum but a lot of ulceration and inflammation in his anal canal.  He reports that he has been doing a bit better.  His perianal complaints have improved. He did have some rectal pain and was seen in [facility] ED with CT with 2 lesions on his liver. he hasn't followed by about this yet. worrisome for mets vs abscess. reports much better on remicade. sb  2/2/23: Mr. Cordero returns for follow-up of ileal Crohn's disease.  He underwent ileostomy reversal with Dr. Johnson.  He is doing well so far.  He is moving his bowels 4-5 times daily. loose, but no blood. denies pain.  His colonoscopy was unable to be performed due to rectal stenosis.  Unfortunately, he was recently admitted to [facility] with procitis and required an anal dil by Dr johnson. He is currently doing well. He is here today to discuss transitioning to remicade from humira.   sb

## 2023-11-09 ENCOUNTER — TELEPHONE ENCOUNTER (OUTPATIENT)
Dept: URBAN - NONMETROPOLITAN AREA CLINIC 2 | Facility: CLINIC | Age: 58
End: 2023-11-09

## 2023-11-09 LAB
A/G RATIO: 0.6
ABSOLUTE BASOPHILS: 51
ABSOLUTE EOSINOPHILS: 449
ABSOLUTE LYMPHOCYTES: 1188
ABSOLUTE MONOCYTES: 342
ABSOLUTE NEUTROPHILS: 3070
AFP, SERUM, TUMOR MARKER: 5.2
ALBUMIN: 2.9
ALKALINE PHOSPHATASE: 107
ALT (SGPT): 12
AST (SGOT): 12
BASOPHILS: 1
BILIRUBIN, TOTAL: 0.2
BUN/CREATININE RATIO: 7
BUN: 18
CALCIUM: 8.6
CARBON DIOXIDE, TOTAL: 25
CHLORIDE: 105
CREATININE: 2.68
EGFR: 27
EOSINOPHILS: 8.8
GLOBULIN, TOTAL: 4.9
GLUCOSE: 87
HEMATOCRIT: 28.9
HEMOGLOBIN: 9.1
LYMPHOCYTES: 23.3
MCH: 27.9
MCHC: 31.5
MCV: 88.7
MONOCYTES: 6.7
MPV: 8.7
NEUTROPHILS: 60.2
PLATELET COUNT: 496
POTASSIUM: 4.3
PROTEIN, TOTAL: 7.8
RDW: 12.2
RED BLOOD CELL COUNT: 3.26
SODIUM: 139
WHITE BLOOD CELL COUNT: 5.1

## 2023-12-11 ENCOUNTER — TELEPHONE ENCOUNTER (OUTPATIENT)
Dept: URBAN - NONMETROPOLITAN AREA CLINIC 2 | Facility: CLINIC | Age: 58
End: 2023-12-11

## 2023-12-14 ENCOUNTER — TELEPHONE ENCOUNTER (OUTPATIENT)
Dept: URBAN - NONMETROPOLITAN AREA CLINIC 2 | Facility: CLINIC | Age: 58
End: 2023-12-14

## 2023-12-14 PROBLEM — 373621006: Status: ACTIVE | Noted: 2023-12-14

## 2023-12-20 ENCOUNTER — LAB OUTSIDE AN ENCOUNTER (OUTPATIENT)
Dept: URBAN - NONMETROPOLITAN AREA CLINIC 2 | Facility: CLINIC | Age: 58
End: 2023-12-20

## 2023-12-22 ENCOUNTER — TELEPHONE ENCOUNTER (OUTPATIENT)
Dept: URBAN - NONMETROPOLITAN AREA CLINIC 2 | Facility: CLINIC | Age: 58
End: 2023-12-22

## 2023-12-22 RX ORDER — UPADACITINIB 45 MG/1
ONE TABLET DAILY TABLET, EXTENDED RELEASE ORAL ONCE DAILY
Qty: 84 | Refills: 0 | OUTPATIENT
Start: 2024-01-03 | End: 2024-03-27

## 2023-12-22 RX ORDER — UPADACITINIB 30 MG/1
1 TABLET TABLET, EXTENDED RELEASE ORAL ONCE A DAY
Qty: 30 | Refills: 5 | OUTPATIENT
Start: 2024-01-03 | End: 2024-07-01

## 2024-01-04 ENCOUNTER — TELEPHONE ENCOUNTER (OUTPATIENT)
Dept: URBAN - NONMETROPOLITAN AREA CLINIC 13 | Facility: CLINIC | Age: 59
End: 2024-01-04

## 2024-01-08 ENCOUNTER — TELEPHONE ENCOUNTER (OUTPATIENT)
Dept: URBAN - NONMETROPOLITAN AREA CLINIC 2 | Facility: CLINIC | Age: 59
End: 2024-01-08

## 2024-01-10 ENCOUNTER — TELEPHONE ENCOUNTER (OUTPATIENT)
Dept: URBAN - NONMETROPOLITAN AREA CLINIC 13 | Facility: CLINIC | Age: 59
End: 2024-01-10

## 2024-01-10 ENCOUNTER — OFFICE VISIT (OUTPATIENT)
Dept: URBAN - NONMETROPOLITAN AREA CLINIC 2 | Facility: CLINIC | Age: 59
End: 2024-01-10
Payer: MEDICARE

## 2024-01-10 VITALS
HEART RATE: 121 BPM | SYSTOLIC BLOOD PRESSURE: 163 MMHG | TEMPERATURE: 98 F | BODY MASS INDEX: 18.33 KG/M2 | WEIGHT: 110 LBS | DIASTOLIC BLOOD PRESSURE: 77 MMHG | HEIGHT: 65 IN

## 2024-01-10 DIAGNOSIS — K50.119 CROHN'S DISEASE OF PERIANAL REGION WITH COMPLICATION: ICD-10-CM

## 2024-01-10 DIAGNOSIS — M06.9 RHEUMATOID ARTHRITIS, INVOLVING UNSPECIFIED SITE, UNSPECIFIED WHETHER RHEUMATOID FACTOR PRESENT: ICD-10-CM

## 2024-01-10 DIAGNOSIS — R93.5 ABNORMAL CT OF THE ABDOMEN: ICD-10-CM

## 2024-01-10 DIAGNOSIS — K62.4 ANAL STENOSIS: ICD-10-CM

## 2024-01-10 DIAGNOSIS — K50.00 CROHN'S DISEASE OF SMALL INTESTINE WITHOUT COMPLICATION: ICD-10-CM

## 2024-01-10 PROBLEM — 69896004: Status: ACTIVE | Noted: 2024-01-10

## 2024-01-10 PROCEDURE — 99214 OFFICE O/P EST MOD 30 MIN: CPT | Performed by: NURSE PRACTITIONER

## 2024-01-10 RX ORDER — COLESTIPOL HYDROCHLORIDE 1 G/1
2 TABLETS TABLET ORAL ONCE A DAY
Qty: 60 | Status: ACTIVE | COMMUNITY

## 2024-01-10 RX ORDER — COLESTIPOL HYDROCHLORIDE 1 G/1
TAKE 2 TABLETS BY MOUTH EVERY DAY TABLET ORAL
Qty: 60 TABLET | Refills: 3 | Status: ACTIVE | COMMUNITY

## 2024-01-10 RX ORDER — SODIUM BICARBONATE 650 MG/1
TAKE 2 TABLETS BY ORAL ROUTE DAILY TABLET ORAL 1
Qty: 0 | Refills: 0 | Status: ACTIVE | COMMUNITY
Start: 1900-01-01

## 2024-01-10 RX ORDER — ADALIMUMAB 40MG/0.4ML
INJECT ONE (1) PEN (40MG) SUBCUTANEOUSLY EVERY OTHER WEEK IN ABDOMEN OR THIGH (ROTATE SITES) KIT SUBCUTANEOUS
Qty: 2 PEN NEEDLE | Refills: 7 | Status: ACTIVE | COMMUNITY

## 2024-01-10 RX ORDER — METRONIDAZOLE 500 MG/1
1 TABLET TABLET, FILM COATED ORAL THREE TIMES A DAY
Qty: 42 TABLET | Refills: 0 | OUTPATIENT
Start: 2024-01-10 | End: 2024-01-24

## 2024-01-10 RX ORDER — INFLIXIMAB 100 MG/1
5MG/KG  0, 2, 6, AND THEN EVERY 8 WEEKS IV INJECTION, POWDER, LYOPHILIZED, FOR SOLUTION INTRAVENOUS
Status: ACTIVE | COMMUNITY
Start: 2023-02-08

## 2024-01-10 RX ORDER — CHOLESTYRAMINE 4 G/9G
MIX 1 SCOOP AS DIRECTED ONCE DAILY POWDER, FOR SUSPENSION ORAL
Qty: 1134 GRAM | Refills: 12 | Status: ACTIVE | COMMUNITY

## 2024-01-10 RX ORDER — PREDNISONE 20 MG/1
40MG X7 DAYS, 30MGX 7 DAYS, 20MG X 7 DAYS, AND 10MG X7 DAYS AND STOP TABLET ORAL ONCE A DAY
Qty: 35 TABLET | Refills: 0 | OUTPATIENT
Start: 2024-01-10 | End: 2024-02-07

## 2024-01-10 RX ORDER — UPADACITINIB 30 MG/1
1 TABLET TABLET, EXTENDED RELEASE ORAL ONCE A DAY
Qty: 30 | Refills: 5 | Status: ACTIVE | COMMUNITY
Start: 2024-01-03 | End: 2024-07-01

## 2024-01-10 RX ORDER — UPADACITINIB 45 MG/1
ONE TABLET DAILY TABLET, EXTENDED RELEASE ORAL ONCE DAILY
Qty: 84 | Refills: 0 | Status: ACTIVE | COMMUNITY
Start: 2024-01-03 | End: 2024-03-27

## 2024-01-10 RX ORDER — CARVEDILOL 6.25 MG/1
1 TABLET WITH FOOD TABLET, FILM COATED ORAL TWICE A DAY
Status: ACTIVE | COMMUNITY

## 2024-01-10 RX ORDER — CIPROFLOXACIN HYDROCHLORIDE 500 MG/1
1 TABLET TABLET, FILM COATED ORAL
Qty: 28 TABLET | Refills: 0 | OUTPATIENT
Start: 2024-01-10 | End: 2024-01-24

## 2024-01-10 NOTE — HPI-TODAY'S VISIT:
: Mr. Cordero returns for follow-up of ileal Crohn's disease.  he underwent a colonoscopy using the XP scope to traverse his anal stenosis.  This showed a normal colon and ileum but a lot of ulceration and inflammation in his anal canal. He is having a lot of issues with rectal pain following BM. He also has sores on his bottom that have been oozing purulent drainage. he feels better shortly after infliximab infusion, but worse closer to next infusion. has been having a lot of joint pain and feels like it is his RA. has appt with pain management this afternoon. sb  2/2/23: Mr. Cordero returns for follow-up of ileal Crohn's disease.  He underwent ileostomy reversal with Dr. Johnson.  He is doing well so far.  He is moving his bowels 4-5 times daily. loose, but no blood. denies pain.  His colonoscopy was unable to be performed due to rectal stenosis.  Unfortunately, he was recently admitted to [facility] with procitis and required an anal dil by Dr johnson. He is currently doing well. He is here today to discuss transitioning to remicade from humira.   sb

## 2024-01-16 ENCOUNTER — TELEPHONE ENCOUNTER (OUTPATIENT)
Dept: URBAN - NONMETROPOLITAN AREA CLINIC 13 | Facility: CLINIC | Age: 59
End: 2024-01-16

## 2024-04-17 ENCOUNTER — OV EP (OUTPATIENT)
Dept: URBAN - NONMETROPOLITAN AREA CLINIC 2 | Facility: CLINIC | Age: 59
End: 2024-04-17
Payer: MEDICARE

## 2024-04-17 VITALS
DIASTOLIC BLOOD PRESSURE: 71 MMHG | TEMPERATURE: 98 F | SYSTOLIC BLOOD PRESSURE: 145 MMHG | WEIGHT: 110 LBS | HEART RATE: 92 BPM | BODY MASS INDEX: 18.33 KG/M2 | HEIGHT: 65 IN

## 2024-04-17 DIAGNOSIS — K50.00 CROHN'S DISEASE OF SMALL INTESTINE WITHOUT COMPLICATION: ICD-10-CM

## 2024-04-17 DIAGNOSIS — R93.5 ABNORMAL CT OF THE ABDOMEN: ICD-10-CM

## 2024-04-17 DIAGNOSIS — K50.119 CROHN'S DISEASE OF PERIANAL REGION WITH COMPLICATION: ICD-10-CM

## 2024-04-17 DIAGNOSIS — K62.4 ANAL STENOSIS: ICD-10-CM

## 2024-04-17 DIAGNOSIS — M06.9 RHEUMATOID ARTHRITIS, INVOLVING UNSPECIFIED SITE, UNSPECIFIED WHETHER RHEUMATOID FACTOR PRESENT: ICD-10-CM

## 2024-04-17 PROCEDURE — 99214 OFFICE O/P EST MOD 30 MIN: CPT | Performed by: NURSE PRACTITIONER

## 2024-04-17 RX ORDER — INFLIXIMAB 100 MG/1
5MG/KG EVERY 8 WEEKS INJECTION, POWDER, LYOPHILIZED, FOR SOLUTION INTRAVENOUS
Status: ACTIVE | COMMUNITY
Start: 2024-02-26

## 2024-04-17 RX ORDER — COLESTIPOL HYDROCHLORIDE 1 G/1
TAKE 2 TABLETS BY MOUTH EVERY DAY TABLET ORAL
Qty: 60 TABLET | Refills: 3 | Status: ACTIVE | COMMUNITY

## 2024-04-17 RX ORDER — UPADACITINIB 30 MG/1
1 TABLET TABLET, EXTENDED RELEASE ORAL ONCE A DAY
Qty: 30 | Refills: 5 | Status: ACTIVE | COMMUNITY
Start: 2024-01-03 | End: 2024-07-01

## 2024-04-17 RX ORDER — INFLIXIMAB 100 MG/1
5MG/KG  0, 2, 6, AND THEN EVERY 8 WEEKS IV INJECTION, POWDER, LYOPHILIZED, FOR SOLUTION INTRAVENOUS
Status: ACTIVE | COMMUNITY
Start: 2023-02-08

## 2024-04-17 RX ORDER — COLESTIPOL HYDROCHLORIDE 1 G/1
2 TABLETS TABLET ORAL ONCE A DAY
Qty: 60 | Status: ACTIVE | COMMUNITY

## 2024-04-17 RX ORDER — CARVEDILOL 6.25 MG/1
1 TABLET WITH FOOD TABLET, FILM COATED ORAL TWICE A DAY
Status: ACTIVE | COMMUNITY

## 2024-04-17 RX ORDER — ADALIMUMAB 40MG/0.4ML
INJECT ONE (1) PEN (40MG) SUBCUTANEOUSLY EVERY OTHER WEEK IN ABDOMEN OR THIGH (ROTATE SITES) KIT SUBCUTANEOUS
Qty: 2 PEN NEEDLE | Refills: 7 | Status: ACTIVE | COMMUNITY

## 2024-04-17 RX ORDER — INFLIXIMAB 100 MG/1
10MG/KG EVERY 6 WEEKS INJECTION, POWDER, LYOPHILIZED, FOR SOLUTION INTRAVENOUS
OUTPATIENT
Start: 2024-04-17

## 2024-04-17 RX ORDER — SODIUM BICARBONATE 650 MG/1
TAKE 2 TABLETS BY ORAL ROUTE DAILY TABLET ORAL 1
Qty: 0 | Refills: 0 | Status: ACTIVE | COMMUNITY
Start: 1900-01-01

## 2024-04-17 RX ORDER — CHOLESTYRAMINE 4 G/9G
MIX 1 SCOOP AS DIRECTED ONCE DAILY POWDER, FOR SUSPENSION ORAL
Qty: 1134 GRAM | Refills: 12 | Status: ACTIVE | COMMUNITY

## 2024-04-17 NOTE — HPI-TODAY'S VISIT:
: Mr. Cordero returns for follow-up of ileal Crohn's disease.  he underwent a colonoscopy using the XP scope to traverse his anal stenosis.  This showed a normal colon and ileum but a lot of ulceration and inflammation in his anal canal. He is having a lot of issues with rectal pain following BM. He also has sores on his bottom that have been oozing purulent drainage. he feels better shortly after infliximab infusion. joint pain stopped and he cancelled rheum appt. didn't see dr doran as requested. sb  2/2/23: Mr. Cordero returns for follow-up of ileal Crohn's disease.  He underwent ileostomy reversal with Dr. Johnson.  He is doing well so far.  He is moving his bowels 4-5 times daily. loose, but no blood. denies pain.  His colonoscopy was unable to be performed due to rectal stenosis.  Unfortunately, he was recently admitted to [facility] with procitis and required an anal dil by Dr johsnon. He is currently doing well. He is here today to discuss transitioning to remicade from humira.   sb

## 2024-07-17 ENCOUNTER — OFFICE VISIT (OUTPATIENT)
Dept: URBAN - NONMETROPOLITAN AREA CLINIC 2 | Facility: CLINIC | Age: 59
End: 2024-07-17
Payer: MEDICARE

## 2024-07-17 ENCOUNTER — TELEPHONE ENCOUNTER (OUTPATIENT)
Dept: URBAN - NONMETROPOLITAN AREA CLINIC 2 | Facility: CLINIC | Age: 59
End: 2024-07-17

## 2024-07-17 ENCOUNTER — DASHBOARD ENCOUNTERS (OUTPATIENT)
Age: 59
End: 2024-07-17

## 2024-07-17 VITALS
SYSTOLIC BLOOD PRESSURE: 145 MMHG | DIASTOLIC BLOOD PRESSURE: 73 MMHG | TEMPERATURE: 98 F | BODY MASS INDEX: 18.33 KG/M2 | HEIGHT: 65 IN | HEART RATE: 92 BPM | WEIGHT: 110 LBS

## 2024-07-17 DIAGNOSIS — K50.119 CROHN'S DISEASE OF PERIANAL REGION WITH COMPLICATION: ICD-10-CM

## 2024-07-17 DIAGNOSIS — K62.4 ANAL STENOSIS: ICD-10-CM

## 2024-07-17 DIAGNOSIS — R93.5 ABNORMAL CT OF THE ABDOMEN: ICD-10-CM

## 2024-07-17 DIAGNOSIS — K50.00 CROHN'S DISEASE OF SMALL INTESTINE WITHOUT COMPLICATION: ICD-10-CM

## 2024-07-17 PROCEDURE — 99214 OFFICE O/P EST MOD 30 MIN: CPT | Performed by: NURSE PRACTITIONER

## 2024-07-17 RX ORDER — CARVEDILOL 6.25 MG/1
1 TABLET WITH FOOD TABLET, FILM COATED ORAL TWICE A DAY
Status: ACTIVE | COMMUNITY

## 2024-07-17 RX ORDER — COLESTIPOL HYDROCHLORIDE 1 G/1
2 TABLETS TABLET ORAL ONCE A DAY
Qty: 60 | Status: ACTIVE | COMMUNITY

## 2024-07-17 RX ORDER — INFLIXIMAB 100 MG/1
10MG/KG EVERY 6 WEEKS INJECTION, POWDER, LYOPHILIZED, FOR SOLUTION INTRAVENOUS
OUTPATIENT

## 2024-07-17 RX ORDER — INFLIXIMAB 100 MG/1
5MG/KG EVERY 8 WEEKS INJECTION, POWDER, LYOPHILIZED, FOR SOLUTION INTRAVENOUS
Status: ACTIVE | COMMUNITY
Start: 2024-02-26

## 2024-07-17 RX ORDER — INFLIXIMAB 100 MG/1
10MG/KG EVERY 6 WEEKS INJECTION, POWDER, LYOPHILIZED, FOR SOLUTION INTRAVENOUS
Status: ACTIVE | COMMUNITY
Start: 2024-04-17

## 2024-07-17 RX ORDER — ADALIMUMAB 40MG/0.4ML
INJECT ONE (1) PEN (40MG) SUBCUTANEOUSLY EVERY OTHER WEEK IN ABDOMEN OR THIGH (ROTATE SITES) KIT SUBCUTANEOUS
Qty: 2 PEN NEEDLE | Refills: 7 | Status: ACTIVE | COMMUNITY

## 2024-07-17 RX ORDER — CHOLESTYRAMINE 4 G/9G
MIX 1 SCOOP AS DIRECTED ONCE DAILY POWDER, FOR SUSPENSION ORAL
Qty: 1134 GRAM | Refills: 12 | Status: ACTIVE | COMMUNITY

## 2024-07-17 RX ORDER — SODIUM BICARBONATE 650 MG/1
TAKE 2 TABLETS BY ORAL ROUTE DAILY TABLET ORAL 1
Qty: 0 | Refills: 0 | Status: ACTIVE | COMMUNITY
Start: 1900-01-01

## 2024-07-17 RX ORDER — COLESTIPOL HYDROCHLORIDE 1 G/1
TAKE 2 TABLETS BY MOUTH EVERY DAY TABLET ORAL
Qty: 60 TABLET | Refills: 3 | Status: ACTIVE | COMMUNITY

## 2024-07-17 RX ORDER — INFLIXIMAB 100 MG/1
5MG/KG  0, 2, 6, AND THEN EVERY 8 WEEKS IV INJECTION, POWDER, LYOPHILIZED, FOR SOLUTION INTRAVENOUS
Status: ACTIVE | COMMUNITY
Start: 2023-02-08

## 2024-07-17 NOTE — HPI-TODAY'S VISIT:
: Mr. Cordero returns for follow-up of ileal Crohn's disease.  he underwent a colonoscopy using the XP scope to traverse his anal stenosis.  This showed a normal colon and ileum but a lot of ulceration and inflammation in his anal canal. he is s/p flex sig with anal dilation with dr doran recently. did have active disease. overall, feeling much better outside of fatigue since then. renflexis was increased to 10mgq 6 weeks, but patient is still just getting q 8 weeks. sb

## 2024-07-20 LAB
A/G RATIO: 0.8
ABSOLUTE BASOPHILS: 40
ABSOLUTE EOSINOPHILS: 366
ABSOLUTE LYMPHOCYTES: 1574
ABSOLUTE MONOCYTES: 624
ABSOLUTE NEUTROPHILS: 7296
ALBUMIN: 3.7
ALKALINE PHOSPHATASE: 121
ALT (SGPT): 8
AST (SGOT): 11
BASOPHILS: 0.4
BILIRUBIN, TOTAL: 0.2
BUN/CREATININE RATIO: 9
BUN: 24
C-REACTIVE PROTEIN, QUANT: 92.8
CALCIUM: 9
CARBON DIOXIDE, TOTAL: 24
CHLORIDE: 102
CREATININE: 2.73
EGFR: 26
EOSINOPHILS: 3.7
GLOBULIN, TOTAL: 4.6
GLUCOSE: 93
HEMATOCRIT: 33
HEMOGLOBIN: 10.2
IRON BIND.CAP.(TIBC): 217
IRON SATURATION: 16
IRON: 34
LYMPHOCYTES: 15.9
MCH: 27.8
MCHC: 30.9
MCV: 89.9
MITOGEN-NIL: >10
MONOCYTES: 6.3
MPV: 9.1
NEUTROPHILS: 73.7
PLATELET COUNT: 302
POTASSIUM: 4.1
PROTEIN, TOTAL: 8.3
QUANTIFERON NIL VALUE: 0.08
QUANTIFERON TB1 AG VALUE: <0
QUANTIFERON TB2 AG VALUE: <0
QUANTIFERON-TB GOLD PLUS: NEGATIVE
RDW: 12.5
RED BLOOD CELL COUNT: 3.67
SODIUM: 139
TSH W/REFLEX TO FT4: 0.58
VITAMIN B12: 429
VITAMIN D,25-OH,TOTAL,IA: 85
WHITE BLOOD CELL COUNT: 9.9

## 2024-07-23 ENCOUNTER — TELEPHONE ENCOUNTER (OUTPATIENT)
Dept: URBAN - NONMETROPOLITAN AREA CLINIC 2 | Facility: CLINIC | Age: 59
End: 2024-07-23

## 2025-01-15 ENCOUNTER — OFFICE VISIT (OUTPATIENT)
Dept: URBAN - NONMETROPOLITAN AREA CLINIC 2 | Facility: CLINIC | Age: 60
End: 2025-01-15
Payer: MEDICARE

## 2025-01-15 ENCOUNTER — LAB OUTSIDE AN ENCOUNTER (OUTPATIENT)
Dept: URBAN - NONMETROPOLITAN AREA CLINIC 2 | Facility: CLINIC | Age: 60
End: 2025-01-15

## 2025-01-15 VITALS
HEART RATE: 99 BPM | DIASTOLIC BLOOD PRESSURE: 72 MMHG | BODY MASS INDEX: 18.99 KG/M2 | HEIGHT: 65 IN | WEIGHT: 114 LBS | SYSTOLIC BLOOD PRESSURE: 136 MMHG

## 2025-01-15 DIAGNOSIS — R93.5 ABNORMAL CT OF THE ABDOMEN: ICD-10-CM

## 2025-01-15 DIAGNOSIS — K50.00 CROHN'S DISEASE OF SMALL INTESTINE WITHOUT COMPLICATION: ICD-10-CM

## 2025-01-15 DIAGNOSIS — K62.4 ANAL STENOSIS: ICD-10-CM

## 2025-01-15 DIAGNOSIS — M06.9 RHEUMATOID ARTHRITIS, INVOLVING UNSPECIFIED SITE, UNSPECIFIED WHETHER RHEUMATOID FACTOR PRESENT: ICD-10-CM

## 2025-01-15 DIAGNOSIS — K50.119 CROHN'S DISEASE OF PERIANAL REGION WITH COMPLICATION: ICD-10-CM

## 2025-01-15 PROCEDURE — 99214 OFFICE O/P EST MOD 30 MIN: CPT | Performed by: NURSE PRACTITIONER

## 2025-01-15 RX ORDER — CARVEDILOL 6.25 MG/1
1 TABLET WITH FOOD TABLET, FILM COATED ORAL TWICE A DAY
Status: ACTIVE | COMMUNITY

## 2025-01-15 RX ORDER — SODIUM BICARBONATE 650 MG/1
TAKE 2 TABLETS BY ORAL ROUTE DAILY TABLET ORAL 1
Qty: 0 | Refills: 0 | Status: ACTIVE | COMMUNITY
Start: 1900-01-01

## 2025-01-15 RX ORDER — INFLIXIMAB 100 MG/1
5MG/KG EVERY 8 WEEKS INJECTION, POWDER, LYOPHILIZED, FOR SOLUTION INTRAVENOUS
Status: ACTIVE | COMMUNITY
Start: 2024-02-26

## 2025-01-15 RX ORDER — COLESTIPOL HYDROCHLORIDE 1 G/1
2 TABLETS TABLET ORAL ONCE A DAY
Qty: 60 | Status: ACTIVE | COMMUNITY

## 2025-01-15 RX ORDER — INFLIXIMAB 100 MG/1
10MG/KG EVERY 6 WEEKS INJECTION, POWDER, LYOPHILIZED, FOR SOLUTION INTRAVENOUS
OUTPATIENT

## 2025-01-15 RX ORDER — CHOLESTYRAMINE 4 G/9G
MIX 1 SCOOP AS DIRECTED ONCE DAILY POWDER, FOR SUSPENSION ORAL
Qty: 1134 GRAM | Refills: 12 | Status: ACTIVE | COMMUNITY

## 2025-01-15 NOTE — HPI-TODAY'S VISIT:
: Mr. Cordero returns for follow-up of ileal Crohn's disease.  he underwent a colonoscopy using the XP scope to traverse his anal stenosis.  This showed a normal colon and ileum but a lot of ulceration and inflammation in his anal canal. he is s/p flex sig with anal dilation with dr doran recently. did have active disease. . renflexis was increased to 10mgq 6 weeks, he is feeling much improved on this dose. sb

## 2025-01-16 LAB
A/G RATIO: 0.8
ABSOLUTE BASOPHILS: 50
ABSOLUTE EOSINOPHILS: 208
ABSOLUTE LYMPHOCYTES: 1600
ABSOLUTE MONOCYTES: 510
ABSOLUTE NEUTROPHILS: 3931
ALBUMIN: 3.9
ALKALINE PHOSPHATASE: 146
ALT (SGPT): 9
AST (SGOT): 13
BASOPHILS: 0.8
BILIRUBIN, TOTAL: 0.2
BUN/CREATININE RATIO: 6
BUN: 20
C-REACTIVE PROTEIN, QUANT: 13.6
CALCIUM: 9.2
CARBON DIOXIDE, TOTAL: 18
CHLORIDE: 109
CREATININE: 3.14
EGFR: 22
EOSINOPHILS: 3.3
GLOBULIN, TOTAL: 4.7
GLUCOSE: 60
HEMATOCRIT: 37.4
HEMOGLOBIN: 11.5
LYMPHOCYTES: 25.4
MCH: 27.4
MCHC: 30.7
MCV: 89
MONOCYTES: 8.1
MPV: 9.4
NEUTROPHILS: 62.4
PLATELET COUNT: 335
POTASSIUM: 4.1
PROTEIN, TOTAL: 8.6
RDW: 12
RED BLOOD CELL COUNT: 4.2
SODIUM: 139
WHITE BLOOD CELL COUNT: 6.3

## 2025-01-21 ENCOUNTER — TELEPHONE ENCOUNTER (OUTPATIENT)
Dept: URBAN - NONMETROPOLITAN AREA CLINIC 2 | Facility: CLINIC | Age: 60
End: 2025-01-21

## 2025-03-31 ENCOUNTER — OFFICE VISIT (OUTPATIENT)
Dept: URBAN - METROPOLITAN AREA MEDICAL CENTER 1 | Facility: MEDICAL CENTER | Age: 60
End: 2025-03-31

## 2025-03-31 RX ORDER — SODIUM BICARBONATE 650 MG/1
TAKE 2 TABLETS BY ORAL ROUTE DAILY TABLET ORAL 1
Qty: 0 | Refills: 0 | Status: ACTIVE | COMMUNITY
Start: 1900-01-01

## 2025-03-31 RX ORDER — CARVEDILOL 6.25 MG/1
1 TABLET WITH FOOD TABLET, FILM COATED ORAL TWICE A DAY
Status: ACTIVE | COMMUNITY

## 2025-03-31 RX ORDER — CHOLESTYRAMINE 4 G/9G
MIX 1 SCOOP AS DIRECTED ONCE DAILY POWDER, FOR SUSPENSION ORAL
Qty: 1134 GRAM | Refills: 12 | Status: ACTIVE | COMMUNITY

## 2025-03-31 RX ORDER — COLESTIPOL HYDROCHLORIDE 1 G/1
2 TABLETS TABLET ORAL ONCE A DAY
Qty: 60 | Status: ACTIVE | COMMUNITY

## 2025-03-31 RX ORDER — INFLIXIMAB 100 MG/1
5MG/KG EVERY 8 WEEKS INJECTION, POWDER, LYOPHILIZED, FOR SOLUTION INTRAVENOUS
Status: ACTIVE | COMMUNITY
Start: 2024-02-26

## 2025-03-31 RX ORDER — INFLIXIMAB 100 MG/1
10MG/KG EVERY 6 WEEKS INJECTION, POWDER, LYOPHILIZED, FOR SOLUTION INTRAVENOUS
Status: ACTIVE | COMMUNITY

## 2025-05-08 ENCOUNTER — OFFICE VISIT (OUTPATIENT)
Age: 60
End: 2025-05-08

## 2025-07-03 ENCOUNTER — TELEPHONE ENCOUNTER (OUTPATIENT)
Dept: URBAN - NONMETROPOLITAN AREA CLINIC 2 | Facility: CLINIC | Age: 60
End: 2025-07-03

## 2025-07-03 RX ORDER — INFLIXIMAB 100 MG/1
5MG/KG EVERY 8 WEEKS INJECTION, POWDER, LYOPHILIZED, FOR SOLUTION INTRAVENOUS
Start: 2024-02-26

## 2025-07-21 ENCOUNTER — OFFICE VISIT (OUTPATIENT)
Dept: URBAN - NONMETROPOLITAN AREA CLINIC 2 | Facility: CLINIC | Age: 60
End: 2025-07-21
Payer: MEDICARE

## 2025-07-21 DIAGNOSIS — M06.9 RHEUMATOID ARTHRITIS, INVOLVING UNSPECIFIED SITE, UNSPECIFIED WHETHER RHEUMATOID FACTOR PRESENT: ICD-10-CM

## 2025-07-21 DIAGNOSIS — K50.119 CROHN'S DISEASE OF PERIANAL REGION WITH COMPLICATION: ICD-10-CM

## 2025-07-21 DIAGNOSIS — K62.4 ANAL STENOSIS: ICD-10-CM

## 2025-07-21 DIAGNOSIS — R93.5 ABNORMAL CT OF THE ABDOMEN: ICD-10-CM

## 2025-07-21 DIAGNOSIS — K50.00 CROHN'S DISEASE OF SMALL INTESTINE WITHOUT COMPLICATION: ICD-10-CM

## 2025-07-21 PROCEDURE — 99214 OFFICE O/P EST MOD 30 MIN: CPT | Performed by: NURSE PRACTITIONER

## 2025-07-21 RX ORDER — CHOLESTYRAMINE 4 G/9G
MIX 1 SCOOP AS DIRECTED ONCE DAILY POWDER, FOR SUSPENSION ORAL
Qty: 1134 GRAM | Refills: 12 | Status: ACTIVE | COMMUNITY

## 2025-07-21 RX ORDER — SODIUM BICARBONATE 650 MG/1
TAKE 2 TABLETS BY ORAL ROUTE DAILY TABLET ORAL 1
Qty: 0 | Refills: 0 | Status: ACTIVE | COMMUNITY
Start: 1900-01-01

## 2025-07-21 RX ORDER — CARVEDILOL 6.25 MG/1
1 TABLET WITH FOOD TABLET, FILM COATED ORAL TWICE A DAY
Status: ACTIVE | COMMUNITY

## 2025-07-21 RX ORDER — INFLIXIMAB 100 MG/1
5MG/KG EVERY 8 WEEKS INJECTION, POWDER, LYOPHILIZED, FOR SOLUTION INTRAVENOUS
Status: ACTIVE | COMMUNITY
Start: 2024-02-26

## 2025-07-21 RX ORDER — INFLIXIMAB 100 MG/1
10MG/KG EVERY 6 WEEKS INJECTION, POWDER, LYOPHILIZED, FOR SOLUTION INTRAVENOUS
OUTPATIENT
Start: 2025-07-21

## 2025-07-21 RX ORDER — COLESTIPOL HYDROCHLORIDE 1 G/1
2 TABLETS TABLET ORAL ONCE A DAY
Qty: 60 | Status: ACTIVE | COMMUNITY

## 2025-07-21 RX ORDER — INFLIXIMAB 100 MG/1
10MG/KG EVERY 6 WEEKS INJECTION, POWDER, LYOPHILIZED, FOR SOLUTION INTRAVENOUS
Status: ACTIVE | COMMUNITY

## 2025-07-21 NOTE — HPI-TODAY'S VISIT:
: Mr. Cordero returns for follow-up of ileal Crohn's disease.  he underwent a colonoscopy using the XP scope to traverse his anal stenosis.  This showed a normal colon and ileum but a lot of ulceration and inflammation in his anal canal. he is s/p flex sig with anal dilation with dr doran recently. did have active disease. . renflexis was increased to 10mgq 6 weeks, he is feeling much improved on this dose. sb since dose increase, had a repeat  colon with anal stenosis but no active disease. repeat in 2027.

## 2025-07-22 ENCOUNTER — TELEPHONE ENCOUNTER (OUTPATIENT)
Dept: URBAN - NONMETROPOLITAN AREA CLINIC 2 | Facility: CLINIC | Age: 60
End: 2025-07-22

## 2025-07-22 LAB
A/G RATIO: 0.7
ABSOLUTE BASOPHILS: 70
ABSOLUTE EOSINOPHILS: 330
ABSOLUTE LYMPHOCYTES: 1860
ABSOLUTE MONOCYTES: 660
ABSOLUTE NEUTROPHILS: 7080
ALBUMIN: 3.4
ALKALINE PHOSPHATASE: 102
ALT (SGPT): 6
AST (SGOT): 10
BASOPHILS: 0.7
BILIRUBIN, TOTAL: 0.2
BUN/CREATININE RATIO: 7
BUN: 22
C-REACTIVE PROTEIN, QUANT: 99.6
CALCIUM: 8.9
CARBON DIOXIDE, TOTAL: 24
CHLORIDE: 106
CREATININE: 3.34
EGFR: 20
EOSINOPHILS: 3.3
GLOBULIN, TOTAL: 4.6
GLUCOSE: 105
HEMATOCRIT: 33.7
HEMOGLOBIN: 10.5
LYMPHOCYTES: 18.6
MCH: 27.5
MCHC: 31.2
MCV: 88.2
MONOCYTES: 6.6
MPV: 10.2
NEUTROPHILS: 70.8
PLATELET COUNT: 377
POTASSIUM: 4.7
PROTEIN, TOTAL: 8
RDW: 11.6
RED BLOOD CELL COUNT: 3.82
SODIUM: 139
WHITE BLOOD CELL COUNT: 10

## 2025-07-24 ENCOUNTER — TELEPHONE ENCOUNTER (OUTPATIENT)
Dept: URBAN - NONMETROPOLITAN AREA CLINIC 2 | Facility: CLINIC | Age: 60
End: 2025-07-24

## 2025-07-31 LAB
MITOGEN-NIL: >10
QUANTIFERON NIL VALUE: 0.05
QUANTIFERON TB1 AG VALUE: 0.02
QUANTIFERON TB2 AG VALUE: 0.01
QUANTIFERON-TB GOLD PLUS: NEGATIVE

## 2025-08-05 ENCOUNTER — TELEPHONE ENCOUNTER (OUTPATIENT)
Dept: URBAN - NONMETROPOLITAN AREA CLINIC 2 | Facility: CLINIC | Age: 60
End: 2025-08-05

## 2025-08-08 ENCOUNTER — TELEPHONE ENCOUNTER (OUTPATIENT)
Dept: URBAN - NONMETROPOLITAN AREA CLINIC 2 | Facility: CLINIC | Age: 60
End: 2025-08-08

## 2025-08-08 RX ORDER — INFLIXIMAB 100 MG/1
10MG/KG EVERY 6 WEEKS INJECTION, POWDER, LYOPHILIZED, FOR SOLUTION INTRAVENOUS
Start: 2025-07-21